# Patient Record
Sex: FEMALE | Race: WHITE | NOT HISPANIC OR LATINO | Employment: FULL TIME | URBAN - METROPOLITAN AREA
[De-identification: names, ages, dates, MRNs, and addresses within clinical notes are randomized per-mention and may not be internally consistent; named-entity substitution may affect disease eponyms.]

---

## 2023-10-25 ENCOUNTER — OFFICE VISIT (OUTPATIENT)
Dept: OBSTETRICS AND GYNECOLOGY | Facility: CLINIC | Age: 23
End: 2023-10-25
Payer: COMMERCIAL

## 2023-10-25 VITALS
WEIGHT: 293 LBS | SYSTOLIC BLOOD PRESSURE: 140 MMHG | BODY MASS INDEX: 50.02 KG/M2 | HEIGHT: 64 IN | DIASTOLIC BLOOD PRESSURE: 90 MMHG

## 2023-10-25 DIAGNOSIS — N93.9 ABNORMAL UTERINE BLEEDING (AUB): Primary | ICD-10-CM

## 2023-10-25 DIAGNOSIS — Z12.4 SCREENING FOR MALIGNANT NEOPLASM OF CERVIX: ICD-10-CM

## 2023-10-25 DIAGNOSIS — Z11.3 SCREEN FOR SEXUALLY TRANSMITTED DISEASES: ICD-10-CM

## 2023-10-25 NOTE — PROGRESS NOTES
Chief Complaint  New Gyn (Patient is here today with c/o irregular periods- started  period and then continued to spot until middle of September she had very heavy bleeding for 2 weeks straight, hasn't had a period yet this month. )    Subjective        Lamar Moreland presents to [facility]  History of Present Illness  Patient here for evaluation of abnormal uterine bleeding.  Problems began around August of this year.  Prior to August she reports her menses have been monthly and been normal.  When she had her menses in August, she states that she continued to have some light bleeding for several days after, which is very unusual for her.  In September she had another menstrual cycle; however, after the completion of the menstrual cycle her bleeding became much heavier and lasted for a couple of weeks.  She reports the passage of clots at that time.  She states she has had continued bleeding since then; however, it has returned to being very light again.  Now she reports that she just notices a scant amount of bleeding when using the restroom or wiping.  She reports that she has never been sexually active.  She did take birth control briefly for a few months when she was about 14; however, she did not take the medication consistently so she stopped it.  She denies any recent weight changes.  She denies hot or cold intolerance.  She denies hair loss or excessive hair growth.  She has never had a Pap smear before.  She is uncertain if she ever received the Gardasil vaccine.    Menstrual History:  OB History          0    Para   0    Term   0       0    AB   0    Living   0         SAB   0    IAB   0    Ectopic   0    Molar   0    Multiple   0    Live Births   0              No LMP recorded (lmp unknown).     History reviewed. No pertinent past medical history.    Past Surgical History:   Procedure Laterality Date    TONSILLECTOMY       Social History     Tobacco Use     "Smoking status: Never    Smokeless tobacco: Never   Vaping Use    Vaping Use: Never used   Substance Use Topics    Alcohol use: Not Currently    Drug use: Never     Family History   Problem Relation Age of Onset    Diabetes Maternal Grandmother     Breast cancer Maternal Aunt     Ovarian cancer Neg Hx     Uterine cancer Neg Hx     Colon cancer Neg Hx      No current outpatient medications on file prior to visit.     No current facility-administered medications on file prior to visit.     No Known Allergies    Review of systems:  Constitutional: No fevers, chills, sweats   Eye: No recent visual problems, denies blurry vision   HEENT: No ear pain, nasal congestion, sore throat, voice changes   Respiratory: No shortness of breath, cough, pain on breathing   Cardiovascular: No Chest pain, palpitations   Gastrointestinal: No nausea, vomiting, diarrhea, constipation   Genitourinary: No hematuria, dysuria, lesions on genitalia   Hema/Lymph: Negative for bruising, no edema   Endocrine: Negative for excessive thirst, excessive hunger, heat or cold intolerance   Musculoskeletal: No joint pain, muscle pain, decreased range of motion   Integumentary: No rash, pruritus, abrasions, lesions   Neurologic: No weakness, numbness, headaches   Psychiatric: No anxiety, depression, mood changes     Objective   Vital Signs:  /90   Ht 162.6 cm (64\")   Wt (!) 137 kg (302 lb 6.4 oz)   BMI 51.91 kg/m²   Estimated body mass index is 51.91 kg/m² as calculated from the following:    Height as of this encounter: 162.6 cm (64\").    Weight as of this encounter: 137 kg (302 lb 6.4 oz).             Physical Exam   Gen: No acute distress, awake and oriented times three  Abdomen: soft, nontender, no masses or hernia, no hepatosplenomegaly, non distended, normoactive bowel sounds  Pelvic: Exam performed in the presence of a female chaperone  Patient has provided verbal consent to proceed with exam.  Normal external female genitalia, no " lesions  Urethra: Normal meatus, no caruncle  Bladder: nontender  Vagina: There is mild amount of dark red menstrual appearing blood noted, no lesions  Cervix: No cervical motion tenderness, no lesions, nonfriable  Uterus: Anteverted, normal size and shape, nontender  Adnexa: No masses or tenderness  External anal exam: Normal appearance, no lesions or hemorrhoids  Rectal: Deferred  Psych: Good judgement and insight, normal affect and mood    Result Review :                   Assessment and Plan   Diagnoses and all orders for this visit:    1. Abnormal uterine bleeding (AUB) (Primary)  -     TSH Rfx On Abnormal To Free T4  -     CBC Auto Differential  -     Ferritin  -     US Non-ob Transvaginal; Future  -     Testosterone  -     Testosterone Free Direct  -     Follicle Stimulating Hormone  -     Luteinizing Hormone  -     Estradiol    Patient with risk factors for PCOS including obesity.  We will check labs as above.  Recommend gynecologic ultrasound to evaluate for fibroids, polyps, etc.  If work-up largely unremarkable, would recommend hormonal contraception for regulation of menses.  Pap smear is recommended today.  Return to the office in about 3 weeks for ultrasound.    2. Screening for malignant neoplasm of cervix  -     IGP,CtNgTv,rfx Apt HPV All    3. Screen for sexually transmitted diseases  -     IGP,CtNgTv,rfx Apt HPV All    Patient has never had a Pap smear before.  We discussed recommendations for routine Pap smears beginning at age 21 regardless of sexual activity.  Furthermore, I feel Pap smear is important for work-up of her irregular bleeding.  Patient agreed with plan for Pap smear today.         Follow Up   Return in about 3 weeks (around 11/15/2023), or GYN US and FU.  Patient was given instructions and counseling regarding her condition or for health maintenance advice. Please see specific information pulled into the AVS if appropriate.

## 2023-10-26 LAB
BASOPHILS # BLD AUTO: 0.07 10*3/MM3 (ref 0–0.2)
BASOPHILS NFR BLD AUTO: 0.5 % (ref 0–1.5)
EOSINOPHIL # BLD AUTO: 0.18 10*3/MM3 (ref 0–0.4)
EOSINOPHIL NFR BLD AUTO: 1.3 % (ref 0.3–6.2)
ERYTHROCYTE [DISTWIDTH] IN BLOOD BY AUTOMATED COUNT: 15.1 % (ref 12.3–15.4)
FERRITIN SERPL-MCNC: 24.2 NG/ML (ref 13–150)
HCT VFR BLD AUTO: 33.1 % (ref 34–46.6)
HGB BLD-MCNC: 10.3 G/DL (ref 12–15.9)
IMM GRANULOCYTES # BLD AUTO: 0.07 10*3/MM3 (ref 0–0.05)
IMM GRANULOCYTES NFR BLD AUTO: 0.5 % (ref 0–0.5)
LYMPHOCYTES # BLD AUTO: 3.18 10*3/MM3 (ref 0.7–3.1)
LYMPHOCYTES NFR BLD AUTO: 23.3 % (ref 19.6–45.3)
MCH RBC QN AUTO: 23.7 PG (ref 26.6–33)
MCHC RBC AUTO-ENTMCNC: 31.1 G/DL (ref 31.5–35.7)
MCV RBC AUTO: 76.1 FL (ref 79–97)
MONOCYTES # BLD AUTO: 0.65 10*3/MM3 (ref 0.1–0.9)
MONOCYTES NFR BLD AUTO: 4.8 % (ref 5–12)
NEUTROPHILS # BLD AUTO: 9.52 10*3/MM3 (ref 1.7–7)
NEUTROPHILS NFR BLD AUTO: 69.6 % (ref 42.7–76)
NRBC BLD AUTO-RTO: 0 /100 WBC (ref 0–0.2)
PLATELET # BLD AUTO: 336 10*3/MM3 (ref 140–450)
RBC # BLD AUTO: 4.35 10*6/MM3 (ref 3.77–5.28)
TSH SERPL DL<=0.005 MIU/L-ACNC: 2.07 UIU/ML (ref 0.27–4.2)
WBC # BLD AUTO: 13.67 10*3/MM3 (ref 3.4–10.8)

## 2023-10-27 ENCOUNTER — PATIENT ROUNDING (BHMG ONLY) (OUTPATIENT)
Dept: OBSTETRICS AND GYNECOLOGY | Facility: CLINIC | Age: 23
End: 2023-10-27
Payer: COMMERCIAL

## 2023-10-27 ENCOUNTER — PATIENT MESSAGE (OUTPATIENT)
Dept: OBSTETRICS AND GYNECOLOGY | Facility: CLINIC | Age: 23
End: 2023-10-27
Payer: COMMERCIAL

## 2023-10-27 NOTE — PROGRESS NOTES
My chart message has been [place] to the patient [place] [facility] [place]

## 2023-10-29 LAB
C TRACH RRNA CVX QL NAA+PROBE: NEGATIVE
CONV .: NORMAL
CYTOLOGIST CVX/VAG CYTO: NORMAL
CYTOLOGY CVX/VAG DOC CYTO: NORMAL
CYTOLOGY CVX/VAG DOC THIN PREP: NORMAL
DX ICD CODE: NORMAL
HIV 1 & 2 AB SER-IMP: NORMAL
Lab: NORMAL
N GONORRHOEA RRNA CVX QL NAA+PROBE: NEGATIVE
OTHER STN SPEC: NORMAL
STAT OF ADQ CVX/VAG CYTO-IMP: NORMAL
T VAGINALIS RRNA SPEC QL NAA+PROBE: NEGATIVE

## 2023-10-30 LAB
ESTRADIOL SERPL-MCNC: 181 PG/ML
FSH SERPL-ACNC: 2.2 MIU/ML
LH SERPL-ACNC: 8.2 MIU/ML
TESTOST FREE SERPL-MCNC: 6.9 PG/ML (ref 0–4.2)
TESTOST SERPL-MCNC: 54 NG/DL (ref 13–71)

## 2023-11-15 ENCOUNTER — OFFICE VISIT (OUTPATIENT)
Dept: OBSTETRICS AND GYNECOLOGY | Facility: CLINIC | Age: 23
End: 2023-11-15
Payer: COMMERCIAL

## 2023-11-15 VITALS
HEIGHT: 64 IN | DIASTOLIC BLOOD PRESSURE: 70 MMHG | WEIGHT: 293 LBS | SYSTOLIC BLOOD PRESSURE: 138 MMHG | BODY MASS INDEX: 50.02 KG/M2

## 2023-11-15 DIAGNOSIS — E28.2 PCOS (POLYCYSTIC OVARIAN SYNDROME): ICD-10-CM

## 2023-11-15 DIAGNOSIS — N93.9 ABNORMAL UTERINE BLEEDING (AUB): Primary | ICD-10-CM

## 2023-11-15 DIAGNOSIS — D50.0 ANEMIA DUE TO CHRONIC BLOOD LOSS: ICD-10-CM

## 2023-11-15 DIAGNOSIS — N83.202 LEFT OVARIAN CYST: ICD-10-CM

## 2023-11-15 RX ORDER — NORGESTIMATE AND ETHINYL ESTRADIOL 0.25-0.035
1 KIT ORAL DAILY
Qty: 28 TABLET | Refills: 2 | Status: SHIPPED | OUTPATIENT
Start: 2023-11-15

## 2023-11-15 RX ORDER — FERROUS SULFATE 325(65) MG
325 TABLET ORAL EVERY OTHER DAY
Qty: 15 TABLET | Refills: 5 | Status: SHIPPED | OUTPATIENT
Start: 2023-11-15

## 2023-11-15 NOTE — PROGRESS NOTES
"Chief Complaint  Gynecologic Exam (Patient is here today for a follow up after u/s- AUB)    Subjective        Lamar Moreland presents to [facility]  History of Present Illness  Patient is here for follow-up of irregular menses.  She is currently bleeding.  She says it has been rather heavy and ongoing for about the last week.  We have discussed options of hormonal contraception.  She reports that she last took any type of hormonal contraception when she was about 14.  She says that she took the pills haphazardly, and as such had irregular bleeding while she was taking it, so overall she was not happy with it at the time.  She also reports that she had a lot of weight gain around that time.  Patient has no immediate plans for trying to conceive.    The following portions of the patient's history were reviewed and updated as appropriate: allergies, current medications, past family history, past medical history, past social history, past surgical history, and problem list.    Patient's last menstrual period was 11/08/2023 (exact date).    Objective   Vital Signs:  /70   Ht 162.6 cm (64\")   Wt (!) 137 kg (302 lb 9.6 oz)   BMI 51.94 kg/m²   Estimated body mass index is 51.94 kg/m² as calculated from the following:    Height as of this encounter: 162.6 cm (64\").    Weight as of this encounter: 137 kg (302 lb 9.6 oz).             Physical Exam   General: No acute distress, awake and oriented x3  Psychiatric: good judgment and insight, normal mood  Neurological: cranial nerves II through XII intact, no deficits    Result Review :  The following data was reviewed by: Mehul Tsai MD on 11/15/2023:        Office Visit on 10/25/2023   Component Date Value Ref Range Status    TSH 10/25/2023 2.070  0.270 - 4.200 uIU/mL Final    Ferritin 10/25/2023 24.20  13.00 - 150.00 ng/mL Final    Results may be falsely decreased if patient taking Biotin.    Diagnosis 10/25/2023 Comment   Final    " Comment: NEGATIVE FOR INTRAEPITHELIAL LESION OR MALIGNANCY.  THIS SPECIMEN WAS RESCREENED AS PART OF OUR  PROGRAM.      Specimen adequacy: 10/25/2023 Comment   Final    Satisfactory for evaluation. No endocervical component is identified.    Clinician Provided ICD-10: 10/25/2023 Comment   Final    Comment: Z12.4  Z11.3      Performed by: 10/25/2023 Comment   Final    Yandel Quispe, Cytotechnologist (ASCP)    QC reviewed by: 10/25/2023 Comment   Final    Maria Madsen Cytotechnologist    . 10/25/2023 .   Final    Note: 10/25/2023 Comment   Final    Comment: The Pap smear is a screening test designed to aid in the detection of  premalignant and malignant conditions of the uterine cervix.  It is not a  diagnostic procedure and should not be used as the sole means of detecting  cervical cancer.  Both false-positive and false-negative reports do occur.      Method: 10/25/2023 Comment   Final    Comment: This liquid based ThinPrep(R) pap test was screened with the  use of an image guided system.      Conv .conv 10/25/2023 Comment   Final    Comment: The HPV DNA reflex criteria were not met with this specimen result  therefore, no HPV testing was performed.      Chlamydia, Nuc. Acid Amp 10/25/2023 Negative  Negative Final    Gonococcus by Nucleic Acid Amp 10/25/2023 Negative  Negative Final    Trichomonas vaginosis 10/25/2023 Negative  Negative Final    Testosterone, Total 10/25/2023 54  13 - 71 ng/dL Final    Testosterone, Free 10/25/2023 6.9 (H)  0.0 - 4.2 pg/mL Final    FSH 10/25/2023 2.2  mIU/mL Final    Comment:                     Adult Female:                        Follicular phase      3.5 -  12.5                        Ovulation phase       4.7 -  21.5                        Luteal phase          1.7 -   7.7                        Postmenopausal       25.8 - 134.8      LH 10/25/2023 8.2  mIU/mL Final    Comment:                     Adult Female:                        Follicular phase      2.4 -   12.6                        Ovulation phase      14.0 -  95.6                        Luteal phase          1.0 -  11.4                        Postmenopausal        7.7 -  58.5      Estradiol 10/25/2023 181.0  pg/mL Final    Comment:                     Adult Female:                        Follicular phase   12.5 -   166.0                        Ovulation phase    85.8 -   498.0                        Luteal phase       43.8 -   211.0                        Postmenopausal     <6.0 -    54.7                      Pregnancy                        1st trimester     215.0 - >4300.0  Roche ECLIA methodology      WBC 10/25/2023 13.67 (H)  3.40 - 10.80 10*3/mm3 Final    RBC 10/25/2023 4.35  3.77 - 5.28 10*6/mm3 Final    Hemoglobin 10/25/2023 10.3 (L)  12.0 - 15.9 g/dL Final    Hematocrit 10/25/2023 33.1 (L)  34.0 - 46.6 % Final    MCV 10/25/2023 76.1 (L)  79.0 - 97.0 fL Final    MCH 10/25/2023 23.7 (L)  26.6 - 33.0 pg Final    MCHC 10/25/2023 31.1 (L)  31.5 - 35.7 g/dL Final    RDW 10/25/2023 15.1  12.3 - 15.4 % Final    Platelets 10/25/2023 336  140 - 450 10*3/mm3 Final    Neutrophil Rel % 10/25/2023 69.6  42.7 - 76.0 % Final    Lymphocyte Rel % 10/25/2023 23.3  19.6 - 45.3 % Final    Monocyte Rel % 10/25/2023 4.8 (L)  5.0 - 12.0 % Final    Eosinophil Rel % 10/25/2023 1.3  0.3 - 6.2 % Final    Basophil Rel % 10/25/2023 0.5  0.0 - 1.5 % Final    Neutrophils Absolute 10/25/2023 9.52 (H)  1.70 - 7.00 10*3/mm3 Final    Lymphocytes Absolute 10/25/2023 3.18 (H)  0.70 - 3.10 10*3/mm3 Final    Monocytes Absolute 10/25/2023 0.65  0.10 - 0.90 10*3/mm3 Final    Eosinophils Absolute 10/25/2023 0.18  0.00 - 0.40 10*3/mm3 Final    Basophils Absolute 10/25/2023 0.07  0.00 - 0.20 10*3/mm3 Final    Immature Granulocyte Rel % 10/25/2023 0.5  0.0 - 0.5 % Final    Immature Grans Absolute 10/25/2023 0.07 (H)  0.00 - 0.05 10*3/mm3 Final    nRBC 10/25/2023 0.0  0.0 - 0.2 /100 WBC Final     Ultrasound today:  Findings:  Uterus measures 6.88 x  3.96 x 4.08 cm, volume 58.203 cm cube  There are no intramural or subserosal fibroids or masses identified.  Endometrial thickness measures 1.63 cm.  There is an echogenic lesion noted within the endometrium measuring about 1.3 x 0.7 cm.  There is no increased vascularity noted to this structure.  Structure could represent neoplasm such as a polyp versus blood clot.  Lack of vascular flow makes a polyp slightly less likely.  Cervix is normal-appearing.    Left ovary: 5.58 x 3.40 x 3.89 cm, volume 38.642 cm cube  There is a simple appearing cyst of the left ovary noted measuring 3.9 x 3.4 cm.    Right ovary: 2.88 x 1.52 x 1.40 cm, volume 3.200 09 cm cube  There is no right adnexal mass or cyst identified.    There is no free fluid.      Impression:    Uterus generally normal-appearing without fibroids or masses.  There is a small endometrial lesion noted measuring 1.3 cm which may represent endometrial polyp versus a blood clot.  Patient is currently on her menstrual cycle at this time.  3.9 cm simple appearing left ovarian cyst noted.  Right ovary normal.    Recommend repeat evaluation in 4-6 weeks to check for resolution of the left ovarian cyst and to reevaluate the endometrial lesion noted.           Assessment and Plan   Diagnoses and all orders for this visit:    1. Abnormal uterine bleeding (AUB) (Primary)  -     norgestimate-ethinyl estradiol (Sprintec 28) 0.25-35 MG-MCG per tablet; Take 1 tablet by mouth Daily.  Dispense: 28 tablet; Refill: 2    2. Anemia due to chronic blood loss  -     ferrous sulfate 325 (65 FE) MG tablet; Take 1 tablet by mouth Every Other Day.  Dispense: 15 tablet; Refill: 5    3. PCOS (polycystic ovarian syndrome)      Reviewed lab and ultrasound findings with the patient.  Given the slight increase in her free testosterone, elevated LH: FSH ratio, and irregularities to her menses, I do feel PCOS is most likely.  We discussed PCOS at length.  Short-term and long-term health risks were  discussed with the patient.  Discussed the recommendations for use of some type of progesterone to prevent unopposed estrogen effect from the lining of the uterus.  We discussed use of either hormonal contraception or cyclical progesterone for regulation of cycles and for prevention of unopposed estrogen effect.  We discussed the short-term and long-term health benefits of weight loss, diet, and exercise.  The patient would like to start on birth control pills at this time.  Discussed the importance of taking her pills regularly every day.  Risk, benefits, alternatives, and side effects discussed.  We discussed the ultrasound findings that showed a possible endometrial polyp versus a blood clot.  She is on her menstrual cycle today, so blood clot is certainly possible.  I would recommend repeat evaluation for this with ultrasound in 4-6 weeks.  If the lesion is still persistent, would likely need hysteroscopy with polypectomy.  Patient verbalized understanding.  Recommend starting iron for anemia.  Hopefully her bleeding improves with the birth control pills and long-term iron would not be necessary.  Discussed the findings of a simple appearing left ovarian cyst.  This is likely benign and typically resolve spontaneously.  We will repeat ultrasound in 4 to 6 weeks to check for resolution.         Follow Up   No follow-ups on file.  Patient was given instructions and counseling regarding her condition or for health maintenance advice. Please see specific information pulled into the AVS if appropriate.

## 2024-01-23 ENCOUNTER — OFFICE VISIT (OUTPATIENT)
Dept: OBSTETRICS AND GYNECOLOGY | Facility: CLINIC | Age: 24
End: 2024-01-23
Payer: COMMERCIAL

## 2024-01-23 VITALS
DIASTOLIC BLOOD PRESSURE: 72 MMHG | HEIGHT: 64 IN | SYSTOLIC BLOOD PRESSURE: 108 MMHG | WEIGHT: 293 LBS | BODY MASS INDEX: 50.02 KG/M2

## 2024-01-23 DIAGNOSIS — N83.202 LEFT OVARIAN CYST: ICD-10-CM

## 2024-01-23 DIAGNOSIS — E28.2 PCOS (POLYCYSTIC OVARIAN SYNDROME): ICD-10-CM

## 2024-01-23 DIAGNOSIS — D50.0 ANEMIA DUE TO CHRONIC BLOOD LOSS: ICD-10-CM

## 2024-01-23 DIAGNOSIS — N93.9 ABNORMAL UTERINE BLEEDING (AUB): Primary | ICD-10-CM

## 2024-01-23 PROCEDURE — 99214 OFFICE O/P EST MOD 30 MIN: CPT | Performed by: OBSTETRICS & GYNECOLOGY

## 2024-01-23 RX ORDER — NORGESTIMATE AND ETHINYL ESTRADIOL 0.25-0.035
1 KIT ORAL DAILY
Qty: 84 TABLET | Refills: 3 | Status: SHIPPED | OUTPATIENT
Start: 2024-01-23

## 2024-01-23 RX ORDER — ERGOCALCIFEROL 1.25 MG/1
50000 CAPSULE ORAL
COMMUNITY
Start: 2024-01-04 | End: 2024-02-23

## 2024-01-23 NOTE — PROGRESS NOTES
"Chief Complaint  Gynecologic Exam (Patient is here for f/u 11/15 us)    Subjective        Lamar Moreland presents to [facility]  History of Present Illness  Patient is here for follow-up of heavy/irregular bleeding after starting on birth control.  She had previously been experiencing periods are lasting weeks at a time.    Since the last visit, she started on combined oral contraceptive pills.  She says her periods been much better since then.  She describes her periods as light and normal at this time.  She is also here for ultrasound follow-up and ovarian cyst.    Patient did have some blood work performed at her primary care office.  Of note, hemoglobin was significantly low at 6.9.  She has started doing IV iron infusions and is about to complete her 6 out of 6 weekly treatments.  She is scheduled to follow-up with them again in 3 months to repeat blood work.    Patient's last menstrual period was 01/08/2024 (approximate).     The following portions of the patient's history were reviewed and updated as appropriate: allergies, current medications, past family history, past medical history, past social history, past surgical history, and problem list.    Objective   Vital Signs:  /72   Ht 162.6 cm (64.02\")   Wt 134 kg (296 lb 3.2 oz)   BMI 50.82 kg/m²   Estimated body mass index is 50.82 kg/m² as calculated from the following:    Height as of this encounter: 162.6 cm (64.02\").    Weight as of this encounter: 134 kg (296 lb 3.2 oz).             Physical Exam   General: No acute distress, awake and oriented x3  Psychiatric: good judgment and insight, normal mood  Neurological: cranial nerves II through XII intact, no deficits    Result Review :    The following data was reviewed by: Mehul Tsai MD on 01/23/2024:    CBC          10/25/2023    14:55 1/2/2024    13:53   CBC   WBC 13.67  9.40       RBC 4.35  3.72       Hemoglobin 10.3  6.9       Hematocrit 33.1  24.9     "   MCV 76.1  66.9       MCH 23.7  18.5       MCHC 31.1  27.7       RDW 15.1  18.5       Platelets 336  389          Details          This result is from an external source.             TSH          10/25/2023    14:55   TSH   TSH 2.070          Ultrasound today:  Findings:  Uterus measures 8.32 x 4.63 x 4.28 cm, volume 86.327 cm cube  There are no intramural or subserosal fibroids or masses identified.  Endometrial thickness measures 1.35 cm, and is homogenous appearance without evidence of polyps.  The previous questionable endometrial polyp versus blood clot is no longer appreciated at this time.  Cervix is normal-appearing.    Left ovary: 2.66 x 2.1 x 2.22 cm, volume 8.68 cm cube  There is no adnexal mass or cyst identified.  The previously identified simple left ovarian cyst is normally seen at this time    Right ovary: 3.24 x 1.51 x 1.79 cm, volume 4.585 cm cube  There is no adnexal mass or cyst identified.    There is no free fluid.    Impression:    Normal pelvic ultrasound.  The previously identified left ovarian cyst is no longer seen at this time.  The previous questionable endometrial polyp versus blood clot is no longer seen at this time.         Assessment and Plan     Diagnoses and all orders for this visit:    1. Abnormal uterine bleeding (AUB) (Primary) -resolved    Patient reports significant improvement in her menstrual cycles and starting the birth control pills.  She has been very happy with this and would like to continue it.  Patient did have significant anemia and the last time she was checked for this about 3 weeks ago.  She has undergone 5 out of 6 planned IV iron infusions.  If her bleeding is improved, potentially we can just continue with the current treatment plan.  I would not repeat her labs at this time given the recent lab evaluation and the concomitant use of IV iron will give false elevations of her serum iron levels.    I will see the patient back in about 3 months for a telehealth  visit.  We can continue to evaluate how her menstrual cycles are doing on the birth control pill.  Hopefully they will continue to be as well-controlled as they are now.  We can also follow-up on her anemia.  Patient agrees with the plan.  Refills of the birth control pills provided.    2. Anemia due to chronic blood loss    Completing IV iron infusions as ordered by primary care physician.  She is to follow-up in 3 months.  Would not repeat CBC and iron levels today as these were recently done and iron would be falsely elevated.    3. PCOS (polycystic ovarian syndrome)    4. Left ovarian cyst -resolved    Cyst no other seen at this time.  Reassurance offered.         Follow Up     Return in about 3 months (around 4/23/2024), or telehealth.  Patient was given instructions and counseling regarding her condition or for health maintenance advice. Please see specific information pulled into the AVS if appropriate.

## 2024-06-05 ENCOUNTER — TELEPHONE (OUTPATIENT)
Dept: OBSTETRICS AND GYNECOLOGY | Facility: CLINIC | Age: 24
End: 2024-06-05
Payer: COMMERCIAL

## 2024-06-05 NOTE — TELEPHONE ENCOUNTER
Caller: CHELY    Relationship: PCP[facility]    Best call back number: [phone]    What form or medical record are you requesting: LATEST PAP SMEAR RESULTS AND THE DATE    How would you like to receive the form or medical records (pick-up, mail, fax): FAX  If fax, what is the fax number: [phone]    Timeframe paperwork needed: ASAP

## 2024-06-12 ENCOUNTER — TELEMEDICINE (OUTPATIENT)
Dept: OBSTETRICS AND GYNECOLOGY | Facility: CLINIC | Age: 24
End: 2024-06-12
Payer: COMMERCIAL

## 2024-06-12 DIAGNOSIS — N93.9 ABNORMAL UTERINE BLEEDING (AUB): Primary | ICD-10-CM

## 2024-06-12 DIAGNOSIS — D50.0 ANEMIA DUE TO CHRONIC BLOOD LOSS: ICD-10-CM

## 2024-06-12 DIAGNOSIS — E28.2 PCOS (POLYCYSTIC OVARIAN SYNDROME): ICD-10-CM

## 2024-06-12 PROCEDURE — 99212 OFFICE O/P EST SF 10 MIN: CPT | Performed by: OBSTETRICS & GYNECOLOGY

## 2024-06-12 NOTE — PROGRESS NOTES
"Chief Complaint  Follow-up of menstrual cycles on oral contraceptive pills    Subjective           Lamar Moreland presents to [facility]  History of Present Illness\  Patient is here today for follow-up of her menstrual cycles on oral contraceptive pills.  Please see my previous notes for details.  In summary, last year in the fall she had significant problems with prolonged and heavy bleeding.  She had gotten significantly anemic and was requiring IV iron.  She started combination oral contraceptive pills in January 2024, and she reports that since then her bleeding has been normal.  She says her periods are much better controlled on the birth control pills.  She has been happy on these and she would like to continue them.  Additionally, she recently saw a new primary care provider for follow-up.  She had a CBC performed which showed a hemoglobin of 13.2, which is markedly improved from previous levels.    She is otherwise without complaints at this time.    The following portions of the patient's history were reviewed and updated as appropriate: allergies, current medications, past family history, past medical history, past social history, past surgical history, and problem list.    Objective   Vital Signs:   There were no vitals taken for this visit.    Estimated body mass index is 50.82 kg/m² as calculated from the following:    Height as of 1/23/24: 162.6 cm (64.02\").    Weight as of 1/23/24: 134 kg (296 lb 3.2 oz).     Virtual Visit Physical Exam  General: No acute distress, awake and oriented x3  Psychiatric: good judgment and insight, normal mood  Neurological: cranial nerves II through XII intact, no deficits    Result Review :     The following data was reviewed by: Mehul Tsia MD on 06/12/2024:    Data reviewed : Consultant notes PCP notes and Labs        Lab Results   Component Value Date    WBC 9.07 05/29/2024    HGB 13.2 05/29/2024    HCT 42.5 05/29/2024    MCV 82.5 " 05/29/2024     05/29/2024          Assessment and Plan      Diagnoses and all orders for this visit:    1. Abnormal uterine bleeding (AUB) (Primary) - resolved    Her menstrual cycles have been normal on combination oral contraceptive pills.  This is markedly improved.  She would like to continue this.  She should have sufficient refills to last her until the time of her annual exam.  Follow-up as needed otherwise.    2. Anemia due to chronic blood loss - resolved    Most recent hemoglobin and hematocrit were normal, and much improved from previous levels.  As long as her menstrual cycles continue to be regular this should not be a problem.  Recommend routine follow-up.    3. PCOS (polycystic ovarian syndrome)        Follow Up     Return in about 5 months (around 11/12/2024), or annual.  Patient was given instructions and counseling regarding her condition or for health maintenance advice. Please see specific information pulled into the AVS if appropriate.     Mode of Visit: Video  Location of patient: home  Location of provider: [facility] [place] [facility]  You have chosen to receive care through a telehealth visit.  The patient has signed the video visit consent form.  The visit included audio and video interaction. No technical issues occurred during this visit.

## 2025-01-07 DIAGNOSIS — N93.9 ABNORMAL UTERINE BLEEDING (AUB): ICD-10-CM

## 2025-01-08 RX ORDER — NORGESTIMATE AND ETHINYL ESTRADIOL 0.25-0.035
1 KIT ORAL DAILY
Qty: 84 TABLET | Refills: 2 | Status: SHIPPED | OUTPATIENT
Start: 2025-01-08

## 2025-01-08 NOTE — TELEPHONE ENCOUNTER
Pt requesting sprintec refill please.   Pharmacy -   [facility] [id number] - [place], [place] - [address] AT [facility] [address] [facility] - [phone] [facility] [phone] FX

## 2025-01-28 ENCOUNTER — OFFICE VISIT (OUTPATIENT)
Dept: OBSTETRICS AND GYNECOLOGY | Facility: CLINIC | Age: 25
End: 2025-01-28
Payer: COMMERCIAL

## 2025-01-28 VITALS
HEIGHT: 63 IN | WEIGHT: 293 LBS | DIASTOLIC BLOOD PRESSURE: 78 MMHG | BODY MASS INDEX: 51.91 KG/M2 | HEART RATE: 83 BPM | SYSTOLIC BLOOD PRESSURE: 136 MMHG

## 2025-01-28 DIAGNOSIS — Z01.419 ENCOUNTER FOR GYNECOLOGICAL EXAMINATION (GENERAL) (ROUTINE) WITHOUT ABNORMAL FINDINGS: Primary | ICD-10-CM

## 2025-01-28 DIAGNOSIS — N93.9 ABNORMAL UTERINE BLEEDING (AUB): ICD-10-CM

## 2025-01-28 DIAGNOSIS — E28.2 PCOS (POLYCYSTIC OVARIAN SYNDROME): ICD-10-CM

## 2025-01-28 PROCEDURE — 99459 PELVIC EXAMINATION: CPT | Performed by: OBSTETRICS & GYNECOLOGY

## 2025-01-28 PROCEDURE — 99395 PREV VISIT EST AGE 18-39: CPT | Performed by: OBSTETRICS & GYNECOLOGY

## 2025-01-28 RX ORDER — ESCITALOPRAM OXALATE 10 MG/1
10 TABLET ORAL DAILY
COMMUNITY
Start: 2025-01-14

## 2025-01-28 RX ORDER — ASCORBIC ACID 500 MG
500 TABLET ORAL DAILY
COMMUNITY

## 2025-01-28 RX ORDER — NORGESTIMATE AND ETHINYL ESTRADIOL 0.25-0.035
1 KIT ORAL DAILY
Qty: 84 TABLET | Refills: 3 | Status: SHIPPED | OUTPATIENT
Start: 2025-01-28

## 2025-01-28 RX ORDER — DIPHENOXYLATE HYDROCHLORIDE AND ATROPINE SULFATE 2.5; .025 MG/1; MG/1
1 TABLET ORAL DAILY
COMMUNITY

## 2025-01-28 NOTE — PROGRESS NOTES
"Chief Complaint  Annual Exam (Pt here for annual exam. Last pap 10/2023 was normal.)    Subjective        Lamar Moreland presents to [facility]  History of Present Illness  Patient is here for annual exam.  Her last Pap smear was October 2023 and was normal.  She has been taking Sprintec oral contraceptive pills for cycle regulation related to PCOS.  She reports that this has been working very well for her and she would like to continue it.  Patient has still never been sexually active.  She is otherwise without gynecologic complaints at this time.    The following portions of the patient's history were reviewed and updated as appropriate: allergies, current medications, past family history, past medical history, past social history, past surgical history, and problem list.    Objective   Vital Signs:  /78   Pulse 83   Ht 160 cm (63\")   Wt 136 kg (299 lb 3.2 oz)   BMI 53.00 kg/m²   Estimated body mass index is 53 kg/m² as calculated from the following:    Height as of this encounter: 160 cm (63\").    Weight as of this encounter: 136 kg (299 lb 3.2 oz).          Physical Exam   Exam performed in the presence of a female chaperone  Patient has provided verbal consent to proceed with exam.    Gen: No acute distress, awake and oriented times three  HENT: Normocephalic, atraumatic, Moist mucous membranes  Eyes: PERRLA, EOMI  Lungs: Normal work of breathing, lungs clear bilaterally  Breast: Symmetrical. No skin changes or nipple retractions. No lumps or masses bilaterally. No tenderness bilaterally.  Abdomen: soft, nontender, no masses or hernia, non distended, normoactive bowel sounds  Pelvic exam performed in the presence of a female RN chaperone.  Patient's partner was also present for pelvic exam.  Patient provided verbal consent to proceed with pelvic exam.      Skin: Warm and dry, no rashes  Psych: Good judgement and insight, normal affect and mood  Neuro: CN 2-12 intact, no gross " deficits    Result Review :                Assessment and Plan   Diagnoses and all orders for this visit:    1. Encounter for gynecological examination (general) (routine) without abnormal findings (Primary)    Pap -up-to-date  STD screening -not sexually active  Contraception - Options discussed with pt at length. Risks, benefits, side effects, and alternatives to various forms of hormonal, nonhormonal and barrier methods discussed. Pt elects to continue OCPs..   Safe sex practices encouraged with patient.  Breast cancer screening. Patient encouraged to perform routine self breast exams. Recommend yearly clinical breast exam and mammogram starting at age 40.  Recommend pt take calcium and vitamin D supplementation as well as prenatal vitamin or folic acid if she is attempting to conceive.  Encourage aerobic exercise with at least 30 minutes 5 days/wk.  Avoid excessive alcohol use.  Patient is advised to call the office for results after 1 week if she has not seen or heard the results of any tests ordered or performed today.    2. Abnormal uterine bleeding (AUB) - resolved  -     norgestimate-ethinyl estradiol (Sprintec 28) 0.25-35 MG-MCG per tablet; Take 1 tablet by mouth Daily.  Dispense: 84 tablet; Refill: 3    Abnormal bleeding well-controlled on oral contraceptive pills.  The patient opts to continue this.  Refills provided.    3. PCOS (polycystic ovarian syndrome)                 Follow Up   Return in about 1 year (around 1/28/2026), or annual..  Patient was given instructions and counseling regarding her condition or for health maintenance advice. Please see specific information pulled into the AVS if appropriate.